# Patient Record
Sex: FEMALE | Race: OTHER
[De-identification: names, ages, dates, MRNs, and addresses within clinical notes are randomized per-mention and may not be internally consistent; named-entity substitution may affect disease eponyms.]

---

## 2017-07-13 ENCOUNTER — RECORD ABSTRACTING (OUTPATIENT)
Age: 27
End: 2017-07-13

## 2017-07-13 DIAGNOSIS — Z33.1 PREGNANT STATE, INCIDENTAL: ICD-10-CM

## 2017-07-13 DIAGNOSIS — Z36 ENCOUNTER FOR ANTENATAL SCREENING OF MOTHER: ICD-10-CM

## 2017-07-13 PROBLEM — Z00.00 ENCOUNTER FOR PREVENTIVE HEALTH EXAMINATION: Status: ACTIVE | Noted: 2017-07-13

## 2017-10-18 ENCOUNTER — ASOB RESULT (OUTPATIENT)
Age: 27
End: 2017-10-18

## 2017-10-18 ENCOUNTER — APPOINTMENT (OUTPATIENT)
Dept: ANTEPARTUM | Facility: CLINIC | Age: 27
End: 2017-10-18
Payer: MEDICAID

## 2017-10-18 PROCEDURE — 76805 OB US >/= 14 WKS SNGL FETUS: CPT

## 2017-10-18 PROCEDURE — 76819 FETAL BIOPHYS PROFIL W/O NST: CPT

## 2017-11-10 ENCOUNTER — TRANSCRIPTION ENCOUNTER (OUTPATIENT)
Age: 27
End: 2017-11-10

## 2017-11-10 ENCOUNTER — INPATIENT (INPATIENT)
Facility: HOSPITAL | Age: 27
LOS: 1 days | Discharge: HOME | End: 2017-11-12
Attending: OBSTETRICS & GYNECOLOGY | Admitting: OBSTETRICS & GYNECOLOGY
Payer: COMMERCIAL

## 2017-11-10 VITALS — WEIGHT: 167.55 LBS | HEIGHT: 58 IN

## 2017-11-10 DIAGNOSIS — O47.1 FALSE LABOR AT OR AFTER 37 COMPLETED WEEKS OF GESTATION: ICD-10-CM

## 2017-11-10 LAB
APPEARANCE UR: CLEAR — SIGNIFICANT CHANGE UP
BACTERIA # UR AUTO: ABNORMAL
BASOPHILS # BLD AUTO: 0 K/UL — SIGNIFICANT CHANGE UP (ref 0–0.2)
BASOPHILS NFR BLD AUTO: 0.2 % — SIGNIFICANT CHANGE UP (ref 0–2)
BILIRUB UR-MCNC: NEGATIVE — SIGNIFICANT CHANGE UP
BLD GP AB SCN SERPL QL: SIGNIFICANT CHANGE UP
COLOR SPEC: YELLOW — SIGNIFICANT CHANGE UP
DIFF PNL FLD: ABNORMAL
EOSINOPHIL # BLD AUTO: 0 K/UL — SIGNIFICANT CHANGE UP (ref 0–0.5)
EOSINOPHIL NFR BLD AUTO: 0.2 % — SIGNIFICANT CHANGE UP (ref 0–6)
GAS PNL BLDCOV: 7.28 — SIGNIFICANT CHANGE UP (ref 7.11–7.36)
GLUCOSE UR QL: NEGATIVE MG/DL — SIGNIFICANT CHANGE UP
HCT VFR BLD CALC: 33.5 % — LOW (ref 37–47)
HGB BLD-MCNC: 11.2 G/DL — LOW (ref 12–16)
KETONES UR-MCNC: NEGATIVE — SIGNIFICANT CHANGE UP
LEUKOCYTE ESTERASE UR-ACNC: ABNORMAL
LYMPHOCYTES # BLD AUTO: 16 % — LOW (ref 20–55)
LYMPHOCYTES # BLD AUTO: 2.1 K/UL — SIGNIFICANT CHANGE UP (ref 1–4.8)
MCHC RBC-ENTMCNC: 30.1 PG — SIGNIFICANT CHANGE UP (ref 27–31)
MCHC RBC-ENTMCNC: 33.4 G/DL — SIGNIFICANT CHANGE UP (ref 32–36)
MCV RBC AUTO: 90.1 FL — SIGNIFICANT CHANGE UP (ref 81–99)
MONOCYTES # BLD AUTO: 0.8 K/UL — SIGNIFICANT CHANGE UP (ref 0–0.8)
MONOCYTES NFR BLD AUTO: 6.1 % — SIGNIFICANT CHANGE UP (ref 3–10)
NEUTROPHILS # BLD AUTO: 10.3 K/UL — HIGH (ref 1.8–8)
NEUTROPHILS NFR BLD AUTO: 77.1 % — HIGH (ref 37–73)
NITRITE UR-MCNC: NEGATIVE — SIGNIFICANT CHANGE UP
PCO2 BLDCOA: 52.8 MMHG — SIGNIFICANT CHANGE UP (ref 32.2–65.8)
PCO2 BLDCOV: 44.7 MMHG — SIGNIFICANT CHANGE UP (ref 27–49.4)
PH BLDCOA: 7.22 — SIGNIFICANT CHANGE UP (ref 7.11–7.36)
PH UR: 8 — SIGNIFICANT CHANGE UP (ref 5–8)
PLATELET # BLD AUTO: 245 K/UL — SIGNIFICANT CHANGE UP (ref 150–400)
PO2 BLDCOA: 17.6 MMHG — SIGNIFICANT CHANGE UP (ref 6–30)
PO2 BLDCOA: 28.5 MMHG — SIGNIFICANT CHANGE UP (ref 17.4–41)
PROT UR-MCNC: NEGATIVE MG/DL — SIGNIFICANT CHANGE UP
RBC # BLD: 3.72 M/UL — LOW (ref 4.4–5.2)
RBC # FLD: 14 % — SIGNIFICANT CHANGE UP (ref 11–15.6)
RBC CASTS # UR COMP ASSIST: ABNORMAL /HPF (ref 0–4)
SAO2 % BLDCOA: SIGNIFICANT CHANGE UP
SAO2 % BLDCOV: SIGNIFICANT CHANGE UP
SP GR SPEC: 1.01 — SIGNIFICANT CHANGE UP (ref 1.01–1.02)
TYPE + AB SCN PNL BLD: SIGNIFICANT CHANGE UP
UROBILINOGEN FLD QL: NEGATIVE MG/DL — SIGNIFICANT CHANGE UP
WBC # BLD: 13.3 K/UL — HIGH (ref 4.8–10.8)
WBC # FLD AUTO: 13.3 K/UL — HIGH (ref 4.8–10.8)
WBC UR QL: SIGNIFICANT CHANGE UP

## 2017-11-10 RX ORDER — OXYTOCIN 10 UNIT/ML
333.33 VIAL (ML) INJECTION
Qty: 20 | Refills: 0 | Status: DISCONTINUED | OUTPATIENT
Start: 2017-11-10 | End: 2017-11-10

## 2017-11-10 RX ORDER — PENICILLIN G POTASSIUM 5000000 [IU]/1
5 POWDER, FOR SOLUTION INTRAMUSCULAR; INTRAPLEURAL; INTRATHECAL; INTRAVENOUS ONCE
Qty: 0 | Refills: 0 | Status: COMPLETED | OUTPATIENT
Start: 2017-11-10 | End: 2017-11-10

## 2017-11-10 RX ORDER — DIPHENHYDRAMINE HCL 50 MG
25 CAPSULE ORAL EVERY 6 HOURS
Qty: 0 | Refills: 0 | Status: DISCONTINUED | OUTPATIENT
Start: 2017-11-10 | End: 2017-11-12

## 2017-11-10 RX ORDER — AZITHROMYCIN 500 MG/1
1000 TABLET, FILM COATED ORAL ONCE
Qty: 0 | Refills: 0 | Status: DISCONTINUED | OUTPATIENT
Start: 2017-11-10 | End: 2017-11-10

## 2017-11-10 RX ORDER — PENICILLIN G POTASSIUM 5000000 [IU]/1
POWDER, FOR SOLUTION INTRAMUSCULAR; INTRAPLEURAL; INTRATHECAL; INTRAVENOUS
Qty: 0 | Refills: 0 | Status: DISCONTINUED | OUTPATIENT
Start: 2017-11-10 | End: 2017-11-12

## 2017-11-10 RX ORDER — ACETAMINOPHEN 500 MG
650 TABLET ORAL EVERY 6 HOURS
Qty: 0 | Refills: 0 | Status: DISCONTINUED | OUTPATIENT
Start: 2017-11-10 | End: 2017-11-12

## 2017-11-10 RX ORDER — SODIUM CHLORIDE 9 MG/ML
1000 INJECTION, SOLUTION INTRAVENOUS ONCE
Qty: 0 | Refills: 0 | Status: DISCONTINUED | OUTPATIENT
Start: 2017-11-10 | End: 2017-11-10

## 2017-11-10 RX ORDER — TETANUS TOXOID, REDUCED DIPHTHERIA TOXOID AND ACELLULAR PERTUSSIS VACCINE, ADSORBED 5; 2.5; 8; 8; 2.5 [IU]/.5ML; [IU]/.5ML; UG/.5ML; UG/.5ML; UG/.5ML
0.5 SUSPENSION INTRAMUSCULAR ONCE
Qty: 0 | Refills: 0 | Status: DISCONTINUED | OUTPATIENT
Start: 2017-11-10 | End: 2017-11-12

## 2017-11-10 RX ORDER — OXYCODONE AND ACETAMINOPHEN 5; 325 MG/1; MG/1
2 TABLET ORAL EVERY 6 HOURS
Qty: 0 | Refills: 0 | Status: DISCONTINUED | OUTPATIENT
Start: 2017-11-10 | End: 2017-11-12

## 2017-11-10 RX ORDER — MAGNESIUM HYDROXIDE 400 MG/1
30 TABLET, CHEWABLE ORAL
Qty: 0 | Refills: 0 | Status: DISCONTINUED | OUTPATIENT
Start: 2017-11-10 | End: 2017-11-12

## 2017-11-10 RX ORDER — LANOLIN
1 OINTMENT (GRAM) TOPICAL EVERY 6 HOURS
Qty: 0 | Refills: 0 | Status: DISCONTINUED | OUTPATIENT
Start: 2017-11-10 | End: 2017-11-12

## 2017-11-10 RX ORDER — OXYTOCIN 10 UNIT/ML
41.67 VIAL (ML) INJECTION
Qty: 20 | Refills: 0 | Status: DISCONTINUED | OUTPATIENT
Start: 2017-11-10 | End: 2017-11-12

## 2017-11-10 RX ORDER — IBUPROFEN 200 MG
600 TABLET ORAL EVERY 6 HOURS
Qty: 0 | Refills: 0 | Status: DISCONTINUED | OUTPATIENT
Start: 2017-11-10 | End: 2017-11-12

## 2017-11-10 RX ORDER — SODIUM CHLORIDE 9 MG/ML
1000 INJECTION, SOLUTION INTRAVENOUS
Qty: 0 | Refills: 0 | Status: DISCONTINUED | OUTPATIENT
Start: 2017-11-10 | End: 2017-11-10

## 2017-11-10 RX ORDER — DOCUSATE SODIUM 100 MG
100 CAPSULE ORAL
Qty: 0 | Refills: 0 | Status: DISCONTINUED | OUTPATIENT
Start: 2017-11-10 | End: 2017-11-12

## 2017-11-10 RX ORDER — SIMETHICONE 80 MG/1
80 TABLET, CHEWABLE ORAL EVERY 6 HOURS
Qty: 0 | Refills: 0 | Status: DISCONTINUED | OUTPATIENT
Start: 2017-11-10 | End: 2017-11-12

## 2017-11-10 RX ORDER — OXYTOCIN 10 UNIT/ML
333.33 VIAL (ML) INJECTION
Qty: 20 | Refills: 0 | Status: COMPLETED | OUTPATIENT
Start: 2017-11-10

## 2017-11-10 RX ORDER — OXYTOCIN 10 UNIT/ML
333.33 VIAL (ML) INJECTION
Qty: 20 | Refills: 0 | Status: DISCONTINUED | OUTPATIENT
Start: 2017-11-10 | End: 2017-11-12

## 2017-11-10 RX ORDER — DIBUCAINE 1 %
1 OINTMENT (GRAM) RECTAL EVERY 4 HOURS
Qty: 0 | Refills: 0 | Status: DISCONTINUED | OUTPATIENT
Start: 2017-11-10 | End: 2017-11-12

## 2017-11-10 RX ORDER — HYDROCORTISONE 1 %
1 OINTMENT (GRAM) TOPICAL EVERY 4 HOURS
Qty: 0 | Refills: 0 | Status: DISCONTINUED | OUTPATIENT
Start: 2017-11-10 | End: 2017-11-12

## 2017-11-10 RX ORDER — GLYCERIN ADULT
1 SUPPOSITORY, RECTAL RECTAL AT BEDTIME
Qty: 0 | Refills: 0 | Status: DISCONTINUED | OUTPATIENT
Start: 2017-11-10 | End: 2017-11-12

## 2017-11-10 RX ORDER — PRAMOXINE HYDROCHLORIDE 150 MG/15G
1 AEROSOL, FOAM RECTAL EVERY 4 HOURS
Qty: 0 | Refills: 0 | Status: DISCONTINUED | OUTPATIENT
Start: 2017-11-10 | End: 2017-11-12

## 2017-11-10 RX ORDER — SODIUM CHLORIDE 9 MG/ML
3 INJECTION INTRAMUSCULAR; INTRAVENOUS; SUBCUTANEOUS EVERY 8 HOURS
Qty: 0 | Refills: 0 | Status: DISCONTINUED | OUTPATIENT
Start: 2017-11-10 | End: 2017-11-12

## 2017-11-10 RX ORDER — CITRIC ACID/SODIUM CITRATE 300-500 MG
30 SOLUTION, ORAL ORAL ONCE
Qty: 0 | Refills: 0 | Status: DISCONTINUED | OUTPATIENT
Start: 2017-11-10 | End: 2017-11-12

## 2017-11-10 RX ORDER — AER TRAVELER 0.5 G/1
1 SOLUTION RECTAL; TOPICAL EVERY 4 HOURS
Qty: 0 | Refills: 0 | Status: DISCONTINUED | OUTPATIENT
Start: 2017-11-10 | End: 2017-11-12

## 2017-11-10 RX ORDER — PENICILLIN G POTASSIUM 5000000 [IU]/1
2.5 POWDER, FOR SOLUTION INTRAMUSCULAR; INTRAPLEURAL; INTRATHECAL; INTRAVENOUS EVERY 4 HOURS
Qty: 0 | Refills: 0 | Status: DISCONTINUED | OUTPATIENT
Start: 2017-11-10 | End: 2017-11-12

## 2017-11-10 RX ADMIN — SODIUM CHLORIDE 125 MILLILITER(S): 9 INJECTION, SOLUTION INTRAVENOUS at 07:00

## 2017-11-10 RX ADMIN — PENICILLIN G POTASSIUM 200 MILLION UNIT(S): 5000000 POWDER, FOR SOLUTION INTRAMUSCULAR; INTRAPLEURAL; INTRATHECAL; INTRAVENOUS at 11:08

## 2017-11-10 RX ADMIN — Medication 600 MILLIGRAM(S): at 20:39

## 2017-11-10 RX ADMIN — Medication 600 MILLIGRAM(S): at 14:01

## 2017-11-10 RX ADMIN — Medication 600 MILLIGRAM(S): at 13:03

## 2017-11-10 RX ADMIN — PENICILLIN G POTASSIUM 200 MILLION UNIT(S): 5000000 POWDER, FOR SOLUTION INTRAMUSCULAR; INTRAPLEURAL; INTRATHECAL; INTRAVENOUS at 07:05

## 2017-11-10 RX ADMIN — Medication 600 MILLIGRAM(S): at 20:09

## 2017-11-10 RX ADMIN — Medication 125 MILLIUNIT(S)/MIN: at 11:56

## 2017-11-10 NOTE — DISCHARGE NOTE OB - PATIENT PORTAL LINK FT
“You can access the [facility] Patient Portal, offered by [facility], by registering with the following website: [email or web address]”

## 2017-11-10 NOTE — DISCHARGE NOTE OB - MATERIALS PROVIDED
Immunization Record/[facility] Hearing Screen Program/Shaken Baby Prevention Handout/Vaccinations/Discharge Medication Information for Patients and Families Pocket Guide/Tdap Vaccination (VIS Pub Date: 2012)/Breastfeeding Log/Guide to Postpartum Care/Back To Sleep Handout/Birth Certificate Instructions/[facility]  Screening Program

## 2017-11-10 NOTE — DISCHARGE NOTE OB - CARE PROVIDER_API CALL
Ford Jauregui), Judith [facility]  [facility] [place] [address]  Phone: [phone]  Fax: [phone]

## 2017-11-10 NOTE — DISCHARGE NOTE OB - MEDICATION SUMMARY - MEDICATIONS TO TAKE
I will START or STAY ON the medications listed below when I get home from the hospital:    ibuprofen 600 mg oral tablet  -- 1 tab(s) by mouth every 6 hours, As needed, Moderate Pain  -- Indication: For pain control    Multinatal Plus oral tablet  -- 1 tab(s) by mouth once a day   -- May discolor urine or feces.  Take with food or milk.    -- Indication: For maternal care

## 2017-11-10 NOTE — DISCHARGE NOTE OB - CARE PLAN
Principal Discharge DX:	 (normal spontaneous vaginal delivery)  Goal:	delivery and recovery  Instructions for follow-up, activity and diet:	Patient should transition to regular activity level. Patient should followup with her doctor for a postpartum checkup.

## 2017-11-10 NOTE — DISCHARGE NOTE OB - HOSPITAL COURSE
25yo  PDD#2 s/p vaginal delivery, stable. Uncomplicated hospital course. At the time of discharge, patient was tolerating a regular diet, voiding, ambulating and pain was well controlled with oral medications. Patient understands she will followup with her doctor within 4-6 weeks for a postpartum checkup.

## 2017-11-10 NOTE — DISCHARGE NOTE OB - PLAN OF CARE
delivery and recovery Patient should transition to regular activity level. Patient should followup with her doctor for a postpartum checkup.

## 2017-11-11 LAB
BASOPHILS # BLD AUTO: 0 K/UL — SIGNIFICANT CHANGE UP (ref 0–0.2)
BASOPHILS NFR BLD AUTO: 0.2 % — SIGNIFICANT CHANGE UP (ref 0–2)
EOSINOPHIL # BLD AUTO: 0 K/UL — SIGNIFICANT CHANGE UP (ref 0–0.5)
EOSINOPHIL NFR BLD AUTO: 0.2 % — SIGNIFICANT CHANGE UP (ref 0–6)
HCT VFR BLD CALC: 34.1 % — LOW (ref 37–47)
HGB BLD-MCNC: 11.4 G/DL — LOW (ref 12–16)
LYMPHOCYTES # BLD AUTO: 22.6 % — SIGNIFICANT CHANGE UP (ref 20–55)
LYMPHOCYTES # BLD AUTO: 3.5 K/UL — SIGNIFICANT CHANGE UP (ref 1–4.8)
MCHC RBC-ENTMCNC: 30.1 PG — SIGNIFICANT CHANGE UP (ref 27–31)
MCHC RBC-ENTMCNC: 33.4 G/DL — SIGNIFICANT CHANGE UP (ref 32–36)
MCV RBC AUTO: 90 FL — SIGNIFICANT CHANGE UP (ref 81–99)
MONOCYTES # BLD AUTO: 0.7 K/UL — SIGNIFICANT CHANGE UP (ref 0–0.8)
MONOCYTES NFR BLD AUTO: 4.7 % — SIGNIFICANT CHANGE UP (ref 3–10)
NEUTROPHILS # BLD AUTO: 11 K/UL — HIGH (ref 1.8–8)
NEUTROPHILS NFR BLD AUTO: 71.8 % — SIGNIFICANT CHANGE UP (ref 37–73)
PLATELET # BLD AUTO: 234 K/UL — SIGNIFICANT CHANGE UP (ref 150–400)
RBC # BLD: 3.79 M/UL — LOW (ref 4.4–5.2)
RBC # FLD: 14.4 % — SIGNIFICANT CHANGE UP (ref 11–15.6)
T PALLIDUM AB TITR SER: NEGATIVE — SIGNIFICANT CHANGE UP
WBC # BLD: 15.4 K/UL — HIGH (ref 4.8–10.8)
WBC # FLD AUTO: 15.4 K/UL — HIGH (ref 4.8–10.8)

## 2017-11-11 RX ORDER — IBUPROFEN 200 MG
1 TABLET ORAL
Qty: 40 | Refills: 0 | OUTPATIENT
Start: 2017-11-11 | End: 2017-11-21

## 2017-11-11 RX ORDER — BENZOYL PEROXIDE MICRONIZED 5.8 %
1 TOWELETTE (EA) TOPICAL
Qty: 0 | Refills: 0 | COMMUNITY

## 2017-11-11 RX ADMIN — Medication 600 MILLIGRAM(S): at 13:18

## 2017-11-11 RX ADMIN — Medication 600 MILLIGRAM(S): at 06:53

## 2017-11-11 RX ADMIN — Medication 1 TABLET(S): at 13:02

## 2017-11-11 NOTE — PROGRESS NOTE ADULT - ASSESSMENT
A/P 26y  PPD#1 s/p , stable.    1. Pain: well controlled on OPM  2. GI: Regular diet  3. : voiding  4. DVT prophylaxis: ambulate  5. Dispo: PPD 2, unless otherwise specified

## 2017-11-12 VITALS
DIASTOLIC BLOOD PRESSURE: 71 MMHG | SYSTOLIC BLOOD PRESSURE: 109 MMHG | TEMPERATURE: 98 F | RESPIRATION RATE: 18 BRPM | HEART RATE: 82 BPM

## 2017-11-12 PROCEDURE — 86780 TREPONEMA PALLIDUM: CPT

## 2017-11-12 PROCEDURE — G0463: CPT

## 2017-11-12 PROCEDURE — T1013: CPT

## 2017-11-12 PROCEDURE — 82803 BLOOD GASES ANY COMBINATION: CPT

## 2017-11-12 PROCEDURE — 85027 COMPLETE CBC AUTOMATED: CPT

## 2017-11-12 PROCEDURE — 81001 URINALYSIS AUTO W/SCOPE: CPT

## 2017-11-12 PROCEDURE — 86901 BLOOD TYPING SEROLOGIC RH(D): CPT

## 2017-11-12 PROCEDURE — 36415 COLL VENOUS BLD VENIPUNCTURE: CPT

## 2017-11-12 PROCEDURE — 59050 FETAL MONITOR W/REPORT: CPT

## 2017-11-12 PROCEDURE — 86900 BLOOD TYPING SEROLOGIC ABO: CPT

## 2017-11-12 PROCEDURE — 86850 RBC ANTIBODY SCREEN: CPT

## 2017-11-12 RX ADMIN — Medication 600 MILLIGRAM(S): at 01:25

## 2017-11-12 RX ADMIN — Medication 600 MILLIGRAM(S): at 00:36

## 2017-11-12 RX ADMIN — Medication 600 MILLIGRAM(S): at 07:46

## 2017-11-12 NOTE — PROGRESS NOTE ADULT - SUBJECTIVE AND OBJECTIVE BOX
A/P 26y on PPD#2 s/p , stable; PP precautions reviewed    ambulating, tolerating, Po, lochia decreased, voiding  Vital Signs Last 24 Hrs  T(C): 37 (2017 19:59), Max: 37 (2017 19:59)  T(F): 98.6 (2017 19:59), Max: 98.6 (2017 19:59)  HR: 74 (2017 19:59) (74 - 77)  BP: 107/71 (2017 19:59) (107/71 - 108/73)  BP(mean): --  RR: 18 (2017 19:59) (18 - 18)  SpO2: --    PHYSICAL EXAM:    CHEST/LUNG: Clear to percussion bilaterally; No rales, rhonchi, wheezing, or rubs  HEART: Regular rate and rhythm; No murmurs, rubs, or gallops  BREASTS: deferred  ABDOMEN: Soft, Nontender, Nondistended; fundus is firm and Bowel sounds present  PERINEUM: Intact  and lochia minimal  EXTREMITIES:  2+ Peripheral Pulses, No clubbing, cyanosis, or edema    MEDICATIONS  (STANDING):  citric acid/sodium citrate Solution 30 milliLiter(s) Oral once  diphtheria/tetanus/pertussis (acellular) Vaccine (ADAcel) 0.5 milliLiter(s) IntraMuscular once  oxytocin Infusion 41.667 milliUNIT(s)/Min (125 mL/Hr) IV Continuous <Continuous>  oxytocin Infusion 333.333 milliUNIT(s)/Min (1000 mL/Hr) IV Continuous <Continuous>  penicillin   G  potassium  IVPB      penicillin   G  potassium  IVPB 2.5 Million Unit(s) IV Intermittent every 4 hours  prenatal multivitamin 1 Tablet(s) Oral daily  sodium chloride 0.9% lock flush 3 milliLiter(s) IV Push every 8 hours    MEDICATIONS  (PRN):  acetaminophen   Tablet 650 milliGRAM(s) Oral every 6 hours PRN For Temp greater than 38.5 C (101.3 F)  acetaminophen   Tablet. 650 milliGRAM(s) Oral every 6 hours PRN Mild Pain  dibucaine 1% Ointment 1 Application(s) Topical every 4 hours PRN Perineal Discomfort  diphenhydrAMINE   Capsule 25 milliGRAM(s) Oral every 6 hours PRN Itching  docusate sodium 100 milliGRAM(s) Oral two times a day PRN Stool Softening  glycerin Suppository - Adult 1 Suppository(s) Rectal at bedtime PRN Constipation  hydrocortisone 1% Cream 1 Application(s) Topical every 4 hours PRN Moderate to Severe Perineal Pain  ibuprofen  Tablet 600 milliGRAM(s) Oral every 6 hours PRN Moderate Pain  lanolin Ointment 1 Application(s) Topical every 6 hours PRN Sore Nipples  magnesium hydroxide Suspension 30 milliLiter(s) Oral two times a day PRN Constipation  oxyCODONE    5 mG/acetaminophen 325 mG 2 Tablet(s) Oral every 6 hours PRN Severe Pain  pramoxine 1%/zinc 5% Cream 1 Application(s) Topical every 4 hours PRN Moderate to Severe Perineal Pain  simethicone 80 milliGRAM(s) Chew every 6 hours PRN Gas  witch hazel Pads 1 Application(s) Topical every 4 hours PRN Perineal Discomfort        LABS:                        11.4   15.4  )-----------( 234      ( 2017 07:15 )             34.1       1. Pain: well controlled on OPM  2. GI: Regular diet  3. : voiding  4. DVT prophylaxis: ambulate  5. Dispo: PPD 2, unless otherwise specified
Patient was seen and examined at bedside. No acute events overnight. Patient feels well. Patient is tolerating a regular diet, no n/v, voiding and ambulating. Pain is well controlled with PO PRN medications. Expectant lochia reports. Patient is breastfeeding and baby is latching on.     Vital Signs Last 24 Hrs  T(C): 36.7 (10 Nov 2017 21:28), Max: 37.2 (10 Nov 2017 15:20)  T(F): 98 (10 Nov 2017 21:28), Max: 98.9 (10 Nov 2017 15:20)  HR: 80 (10 Nov 2017 21:28) (71 - 81)  BP: 108/72 (10 Nov 2017 21:28) (104/58 - 115/58)  RR: 18 (10 Nov 2017 21:28) (16 - 18)      PHYSICAL EXAM:  GENERAL: NAD  BREASTS: Not engorged nipples everted  ABDOMEN: Soft, Nontender, Nondistended; Bowel sounds present  PERINEUM: Intact  and lochia minimal  EXTREMITIES:  2+ Peripheral Pulses, No clubbing, cyanosis, or edema    MEDICATIONS  (STANDING):  diphtheria/tetanus/pertussis (acellular) Vaccine (ADAcel) 0.5 milliLiter(s) IntraMuscular once      LABS:                        11.4   15.4  )-----------( 234      ( 11 Nov 2017 07:15 )             34.1

## 2021-09-29 ENCOUNTER — ASOB RESULT (OUTPATIENT)
Age: 31
End: 2021-09-29

## 2021-09-29 ENCOUNTER — APPOINTMENT (OUTPATIENT)
Dept: ANTEPARTUM | Facility: CLINIC | Age: 31
End: 2021-09-29
Payer: MEDICAID

## 2021-09-29 PROCEDURE — 76817 TRANSVAGINAL US OBSTETRIC: CPT

## 2022-10-17 NOTE — PATIENT PROFILE OB - WEIGHT: PREPREGNANCY IN LBS
Charles Trevino presents to the clinic today for management of her anticoagulation therapy in treatment of her atrial fibrillation. Target INR is 2.0-3.0. Her INR today was subtherapeutic at 1.7 on 18.75 mg of Warfarin weekly. Her previous INR was therapeutic at 2.3 on 10/10/22. The patient denies medication changes since the last visit. She  denies diet changes since the last visit. She was not able to ambulate to office without assistive device, she uses a cane. Charles will take an increased dose of 20 mg of Warfarin weekly (5 mg on Mondays and 2.5 mg the other 6 days of the week) and return to the clinic in one week on 10/24/22 for INR fingerstick. Onsite billing provider is ROBERT Austin.     INR today is below range, cause unknown. Safety precautions and diet reviewed.     Writer advised Charles to seek medical attention immediately if she notices any of the following symptoms of a blood clot:   Sudden weakness in any limb  Numbness or tingling anywhere  Visual changes or loss of sight in either eye  Sudden onset of slurred speech or inability to speak  Dizziness or faintness  New pain, swelling, redness or heat in any extremity  New SOB or chest pain  Symptoms associated with blood clotting/low INR reviewed and Charles verbalizes understanding.    
151

## 2024-10-22 NOTE — DISCHARGE NOTE OB - MEDICATION SUMMARY - MEDICATIONS TO STOP TAKING
high risk/yes
I will STOP taking the medications listed below when I get home from the hospital:  None